# Patient Record
Sex: MALE | Race: WHITE | NOT HISPANIC OR LATINO | Employment: STUDENT | URBAN - METROPOLITAN AREA
[De-identification: names, ages, dates, MRNs, and addresses within clinical notes are randomized per-mention and may not be internally consistent; named-entity substitution may affect disease eponyms.]

---
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
## 2022-05-09 ENCOUNTER — TELEPHONE (OUTPATIENT)
Dept: PEDIATRICS | Facility: CLINIC | Age: 22
End: 2022-05-09

## 2022-05-09 NOTE — TELEPHONE ENCOUNTER
LMTCB #2 that appointment on 05/19/2022 has been cancelled due to Dr Abbott will be out of the office that day and needs to be rescheduled

## 2022-05-16 ENCOUNTER — TELEPHONE (OUTPATIENT)
Dept: PEDIATRICS | Facility: CLINIC | Age: 22
End: 2022-05-16

## 2022-05-16 NOTE — TELEPHONE ENCOUNTER
Called and left a message on patients cell phone and home phone and stated to call back.    He is scheduled today on  schedule and will have to reschedule for a physical with another provider as she only sees established patients--he can reschedule with  or with any other family doc.    Cheryl ROBB CMA  [place] [facility]

## 2022-05-19 ENCOUNTER — OFFICE VISIT (OUTPATIENT)
Dept: FAMILY MEDICINE | Facility: CLINIC | Age: 22
End: 2022-05-19
Payer: COMMERCIAL

## 2022-05-19 VITALS
BODY MASS INDEX: 18.21 KG/M2 | SYSTOLIC BLOOD PRESSURE: 106 MMHG | HEART RATE: 78 BPM | WEIGHT: 146.5 LBS | DIASTOLIC BLOOD PRESSURE: 68 MMHG | HEIGHT: 75 IN

## 2022-05-19 DIAGNOSIS — J30.9 ALLERGIC RHINOCONJUNCTIVITIS: ICD-10-CM

## 2022-05-19 DIAGNOSIS — Z00.00 ROUTINE GENERAL MEDICAL EXAMINATION AT A HEALTH CARE FACILITY: Primary | ICD-10-CM

## 2022-05-19 DIAGNOSIS — H10.10 ALLERGIC RHINOCONJUNCTIVITIS: ICD-10-CM

## 2022-05-19 DIAGNOSIS — J45.909 MILD ASTHMA WITHOUT COMPLICATION, UNSPECIFIED WHETHER PERSISTENT: ICD-10-CM

## 2022-05-19 PROCEDURE — 99213 OFFICE O/P EST LOW 20 MIN: CPT | Mod: 25 | Performed by: FAMILY MEDICINE

## 2022-05-19 PROCEDURE — 99395 PREV VISIT EST AGE 18-39: CPT | Performed by: FAMILY MEDICINE

## 2022-05-19 RX ORDER — MONTELUKAST SODIUM 10 MG/1
10 TABLET ORAL AT BEDTIME
Qty: 30 TABLET | Refills: 1 | Status: SHIPPED | OUTPATIENT
Start: 2022-05-19

## 2022-05-19 ASSESSMENT — ASTHMA QUESTIONNAIRES: ACT_TOTALSCORE: 21

## 2022-05-19 NOTE — PROGRESS NOTES
SUBJECTIVE:   CC: Carlos Dunn is an 22 year old male who presents for preventative health visit.     Patient has been advised of split billing requirements and indicates understanding: Yes     Healthy Habits:     Getting at least 3 servings of Calcium per day:  Yes    Bi-annual eye exam:  Yes    Dental care twice a year:  Yes    Sleep apnea or symptoms of sleep apnea:  None    Diet:  Regular (no restrictions)    Frequency of exercise:  2-3 days/week    Duration of exercise:  45-60 minutes    Taking medications regularly:  Yes    Medication side effects:  None    PHQ-2 Total Score: 0    Additional concerns today:  Yes    The patient would like to discuss his allergy symptoms.  The patient has a history of fall/spring allergies, treated with Zyrtec 10 mg daily.  Patient has spring allergies currently, including nasal congestion and itchy/watery eyes.  No previous Flonase treatment.  Patient is on Flovent 2 puffs BID for his asthma (restarted < 2 weeks ago), which he only uses during spring and fall allergy seasons.  Triggers for asthma:  allergies (especially in the fall)  Patient would like to try Singulair (post risks/benefits discussion), due to his uncontrolled spring allergy symptoms.    ACT Total Scores 7/7/2020 5/19/2022   ACT TOTAL SCORE (Goal Greater than or Equal to 20) 25 21   In the past 12 months, how many times did you visit the emergency room for your asthma without being admitted to the hospital? 0 0   In the past 12 months, how many times were you hospitalized overnight because of your asthma? 0 0     Today's PHQ-2 Score:   PHQ-2 ( 1999 Pfizer) 5/19/2022   Q1: Little interest or pleasure in doing things 0   Q2: Feeling down, depressed or hopeless 0   PHQ-2 Score 0   Q1: Little interest or pleasure in doing things Not at all   Q2: Feeling down, depressed or hopeless Not at all   PHQ-2 Score 0     Abuse: Current or Past(Physical, Sexual or Emotional)- No  Do you feel safe in your environment?  Yes    Have you ever done Advance Care Planning? (For example, a Health Directive, POLST, or a discussion with a medical provider or your loved ones about your wishes): Patient declines.    Social History     Tobacco Use     Smoking status: Never Smoker     Smokeless tobacco: Never Used   Substance Use Topics     Alcohol use: Not on file       Alcohol Use 5/19/2022   Prescreen: >3 drinks/day or >7 drinks/week? Not Applicable       Last PSA: No results found for: PSA    Reviewed orders with patient. Reviewed health maintenance and updated orders accordingly - Yes  Patient Active Problem List   Diagnosis     Allergic rhinoconjunctivitis     Mild asthma without complication     Eczema     Allergy To Antibiotic Agents Penicillins     History reviewed. No pertinent surgical history.    Social History     Tobacco Use     Smoking status: Never Smoker     Smokeless tobacco: Never Used   Substance Use Topics     Alcohol use: Not on file     Family History   Problem Relation Age of Onset     Osteoarthritis Mother      Asthma Mother      Migraines Maternal Aunt      Diabetes Maternal Uncle 20.00        Type 1     Hypertension Paternal Grandmother      Dementia Paternal Grandmother      Brain Cancer Paternal Grandfather            Reviewed and updated as needed this visit by clinical staff   Tobacco  Allergies  Meds  Problems  Med Hx  Surg Hx  Fam Hx          Reviewed and updated as needed this visit by Provider   Tobacco  Allergies  Meds  Problems  Med Hx  Surg Hx  Fam Hx             Review of Systems  CONSTITUTIONAL: NEGATIVE for fever, chills, change in weight  INTEGUMENTARY/SKIN: NEGATIVE for worrisome rashes, moles or lesions  EYES: See the HPI.  NEGATIVE for vision changes  ENT: See the HPI.  NEGATIVE for ear, mouth and throat problems  RESP: Slight cough, due to asthma.  NEGATIVE for significant cough or SOB  CV: NEGATIVE for chest pain, palpitations or peripheral edema  GI: NEGATIVE for nausea, abdominal  "pain, heartburn, or change in bowel habits   male: negative for dysuria, hematuria, decreased urinary stream, erectile dysfunction, urethral discharge  MUSCULOSKELETAL: NEGATIVE for significant arthralgias or myalgia  NEURO: NEGATIVE for headaches, weakness, dizziness or paresthesias  PSYCHIATRIC: NEGATIVE for changes in mood or affect    OBJECTIVE:   /68 (BP Location: Right arm, Patient Position: Sitting, Cuff Size: Adult Regular)   Pulse 78   Ht 1.905 m (6' 3\")   Wt 66.5 kg (146 lb 8 oz)   BMI 18.31 kg/m      Physical Exam  GENERAL: healthy, alert and no distress  EYES: Eyes grossly normal to inspection, PERRL and conjunctivae and sclerae normal  HENT: Ear canals and TM's normal.  Mild nasal congestion.  Nose and mouth without ulcers or lesions.  NECK: no adenopathy, no asymmetry, masses, or scars and thyroid normal to palpation  RESP: lungs clear to auscultation - no rales, rhonchi or wheezes  CV: regular rate and rhythm, normal S1 S2, no S3 or S4, no murmur, click or rub, no peripheral edema and peripheral pulses strong  ABDOMEN: soft, nontender, no hepatosplenomegaly, no masses and bowel sounds normal   (male): discussed with and declined by patient   MS: no gross musculoskeletal defects noted, no edema  SKIN: no suspicious lesions or rashes  NEURO: Normal strength and tone, mentation intact and speech normal  PSYCH: mentation appears normal, affect normal/bright    ASSESSMENT/PLAN:       ICD-10-CM    1. Routine general medical examination at a health care facility  Z00.00    2. Mild asthma without complication, unspecified whether persistent, treated with Flovent during spring and fall allergies seasons J45.909 montelukast (SINGULAIR) 10 MG tablet   3. Allergic rhinoconjunctivitis, suboptimally controlled on Zyrtec J30.9 montelukast (SINGULAIR) 10 MG tablet    H10.10        Patient has been advised of split billing requirements and indicates understanding: Yes    COUNSELING:   Reviewed preventive " "health counseling, as reflected in patient instructions    Estimated body mass index is 18.31 kg/m  as calculated from the following:    Height as of this encounter: 1.905 m (6' 3\").    Weight as of this encounter: 66.5 kg (146 lb 8 oz).     He reports that he has never smoked. He has never used smokeless tobacco.    Declines HIV and Hepatitis C screening.    Declines STD or biometric screening.    Declines HPV vaccination.    Discussed risks and benefits of asthma/allergy treatment strategies:  -Continue Zyrtec.  -Consider over-the-counter Flonase, only as discussed.  -Add Singulair, as discussed.  -If asthma/allergies are well-controlled on the combination of Singulair and Flovent, consider stopping Flovent treatment.  -Restart Flovent if asthma/allergy symptoms recur, as discussed.  -Follow-up in 1 month, as needed.  -Follow-up sooner if worsening symptoms.    Counseling Resources:  ATP IV Guidelines  Pooled Cohorts Equation Calculator  FRAX Risk Assessment  ICSI Preventive Guidelines  Dietary Guidelines for Americans, 2010  USDA's MyPlate  ASA Prophylaxis  Lung CA Screening    Sharifa Sotelo MD  [facility]  "

## 2022-10-11 DIAGNOSIS — J45.20 MILD INTERMITTENT ASTHMA WITHOUT COMPLICATION: ICD-10-CM

## 2022-10-11 NOTE — TELEPHONE ENCOUNTER
Reason for Call:  Medication refill:    Do you use a [facility]?  [place]cher of the pharmacy and phone number for the current request: [facility]     Name of the medication requested:     Albuterol inhaler  Fluticasone inhaler  Prednisone     Other request: Per Mom patient also needs prednisone for the RED ZONE    Medication appears on history list, per protocol forwarding directly to provider.    [facility]    Can we leave a detailed message on this number? YES    Phone number patient can be reached at: Cell number on file:    Telephone Information:   Mobile [phone]       Best Time: Any    Call taken on 10/11/2022 at 10:25 AM by Johanny Fulton

## 2022-10-12 NOTE — TELEPHONE ENCOUNTER
10-12-22  Mom called checking status on meds, mom is leaving today to see child @ college and mom would like to pick these meds up to bring with her to give to child @ college  lexie

## 2022-10-13 RX ORDER — PREDNISONE 20 MG/1
20 TABLET ORAL 2 TIMES DAILY
Qty: 10 TABLET | Refills: 0 | Status: SHIPPED | OUTPATIENT
Start: 2022-10-13

## 2022-10-13 RX ORDER — FLUTICASONE PROPIONATE 110 UG/1
AEROSOL, METERED RESPIRATORY (INHALATION)
Qty: 12 G | Refills: 1 | Status: SHIPPED | OUTPATIENT
Start: 2022-10-13

## 2022-10-13 RX ORDER — ALBUTEROL SULFATE 90 UG/1
AEROSOL, METERED RESPIRATORY (INHALATION)
Qty: 36 G | Refills: 1 | Status: SHIPPED | OUTPATIENT
Start: 2022-10-13

## 2022-11-20 ENCOUNTER — HEALTH MAINTENANCE LETTER (OUTPATIENT)
Age: 22
End: 2022-11-20

## 2023-04-20 ENCOUNTER — PATIENT OUTREACH (OUTPATIENT)
Dept: CARE COORDINATION | Facility: CLINIC | Age: 23
End: 2023-04-20
Payer: COMMERCIAL

## 2023-05-04 ENCOUNTER — PATIENT OUTREACH (OUTPATIENT)
Dept: CARE COORDINATION | Facility: CLINIC | Age: 23
End: 2023-05-04
Payer: COMMERCIAL

## 2023-07-08 ENCOUNTER — HEALTH MAINTENANCE LETTER (OUTPATIENT)
Age: 23
End: 2023-07-08

## 2024-08-31 ENCOUNTER — HEALTH MAINTENANCE LETTER (OUTPATIENT)
Age: 24
End: 2024-08-31